# Patient Record
(demographics unavailable — no encounter records)

---

## 2024-12-17 NOTE — PHYSICAL EXAM
[Chaperone Present] : A chaperone was present in the examining room during all aspects of the physical examination [37120] : A chaperone was present during the pelvic exam. [Appropriately responsive] : appropriately responsive [Alert] : alert [No Acute Distress] : no acute distress [No Lymphadenopathy] : no lymphadenopathy [Regular Rate Rhythm] : regular rate rhythm [No Murmurs] : no murmurs [Clear to Auscultation B/L] : clear to auscultation bilaterally [Soft] : soft [Non-tender] : non-tender [Non-distended] : non-distended [No Lesions] : no lesions [No Mass] : no mass [Oriented x3] : oriented x3 [Examination Of The Breasts] : a normal appearance [No Masses] : no breast masses were palpable [Labia Majora] : normal [Labia Minora] : normal [Normal] : normal [Uterine Adnexae] : normal [FreeTextEntry2] : jono

## 2024-12-17 NOTE — PLAN
[FreeTextEntry1] : annual: P 4 PAP and hpv and gc/ct  frustated with post coital bleeding was doing fine on nexplanon but now this is out since Dr Oshea removed tubes in feb *I did note fibroids on exam : saline sonohyst ordered *I did also note cx bleeds readily on contact (pap): may try sliver nitrazine  *note currently on wellbutrn/ metformin for wt loss

## 2024-12-17 NOTE — HISTORY OF PRESENT ILLNESS
[Patient reported PAP Smear was normal] : Patient reported PAP Smear was normal [N] : Patient does not use contraception [Y] : Positive pregnancy history [Menarche Age: ____] : age at menarche was [unfilled] [Men] : men [No] : No [Regular Cycle Intervals] : periods have been regular [Frequency: Q ___ days] : menstrual periods occur approximately every [unfilled] days [PapSmeardate] : 2023 [LMPDate] : 11/22/24 [MensesFreq] : 30 [MensesLength] : 9 [PGxTotal] : 7 [[facility]xLiving] : 4 [TextBox_6] : 11/22/24 [TextBox_9] : 13 [FreeTextEntry1] : 11/22/24

## 2025-01-24 NOTE — ASSESSMENT
[FreeTextEntry1] : Assessment:  HOLLIS OSUNA is a 38 year F with a recently passed left ureteral stone. Also with possible recurrent UTIs, not confirmed with cultures.    We discussed generalized stone prevention strategies, metabolic evaluation (serum chemistry profile and 24-hour urine collection +/- PTH testing if serum Ca is elevated), and the natural history of kidney stone disease. I explained that first-time stone formers generally do not need a complete metabolic work-up in the absence of risk factors. However, without behavioral and dietary modification, they are at a heightened risk of developing future symptomatic stones: 10% at 2 years, 20% at 5 years, and 30% at 10 years ([facility] data). In recurrent stone formers, the risk of recurrence is considerably higher with a lifetime recurrence rate of 60-80%. Risk factors include stone type, total stone volume, family history, multiple stones, and many medical comorbidities (DM, HTN, HLD, obesity, gout, HPT).    Generalized stone prevention counseling was provided as follows:  1. High fluid intake. The goal should be to drink enough to produce 2.5 liters (quarts) of urine daily. Most studies have shown that high fluid volume intake will decrease the risk of stone formation. Research generally indicates that there appears to be little difference between hard and soft water in the formation of kidney stones. Lemon juice added to the water may also aid with increasing urine pH, urine citric acid and reducing stone formation.    2. Dietary recommendations. The key to all dietary recommendations is moderation.   Decrease animal protein consumption. High protein intake increases urinary calcium, oxalate, and uric acid excretion into the urine - all of which will increase the probability of stone formation.   Decrease sodium intake by avoiding heavily salted foods, and resist adding salt to food at the table. Sodium restriction is widely recognized as an important element of dietary prevention of recurrent kidney stones.   Dietary calcium. Patient should consume a daily recommended allowance of dietary calcium (800-1200 mg). Patients who take in too much Ca or too little Ca are at an increased risk of recurrent stone formation. Dietary calcium is preferable to supplements. Calcium supplementation can be safe when the calcium is taken with meals, rather than at bedtime. Another suggestion for patients who have been recommended to take calcium supplements for their bone health is to consider using calcium citrate, an over-the-counter preparation that provides 950 mg of calcium citrate and 200 mg of elemental calcium in each tablet.   Avoid excess intake of foods with high oxalate content, including dark leafy greens, beets, nuts, tea, coffee, and chocolate. Strict avoidance of oxalate is not necessary in most cases.   Avoid high doses of vitamin C. Intake should be limited to a maximum daily dose of less than 2 gm (2,000 mg).       Plan:  -[facility] -Follow up visit in 1 month with renal ultrasound  -Notify the office if UTI symptoms develop for UCx

## 2025-01-24 NOTE — ADDENDUM
[FreeTextEntry1] : I, Dr. Ken Vogel, personally performed the evaluation and management (E/M) services for this new patient. This includes conducting the clinically appropriate initial history &/or physical exam, assessing all conditions, and establishing the care plan. My NP, Reina Parson, was here to observe my E/M services for this patient.I, Dr. Ken oVgel, personally performed the evaluation and management (E/M) services for this new patient. This includes conducting the clinically appropriate initial history &/or physical exam, assessing all conditions, and establishing the care plan. My NP, Reina Parson, was here to observe my E/M services for this patient.

## 2025-01-24 NOTE — HISTORY OF PRESENT ILLNESS
[FreeTextEntry1] : Name HOLLIS OSUNA MRN [id number]  1986 ------------------------------------------------------------------------------------------------------------------------------------------- Date of First Visit:  2025 Referring Provider/PCP: [facility] ED  ------------------------------------------------------------------------------------------------------------------------------------------- CC: kidney stones   History of Present Illness: HOLLIS OSUNA is a 38-year-old female with no significant PMH of who presents for evaluation of kidney stones. She presented to [facility] ED on  with left flank pain. CT demonstrated a 3mm stone in the left UVJ with mild hydronephrosis. WBC 8.63, creatinine 0.78, UA with blood and small leukocytes. Stone passed two days ago. Was not able to save for analysis. She still has some bladder discomfort and sensitivity. She has had several reported UTI's over the past few months and experiences frequency, suprapubic pain and strong-smelling urine (August, October, January). No formal UCX checked but feels better after abx treatment.   Imaging: CT scan from 2025 can be found in [facility] PACS. Findings: A 3 mm stone in the distal left ureter/UVJ resulting in mild left-sided hydronephrosis with trace perinephric and periureteral fat stranding.    Previous urine cultures: 2025: Negative    Kidney Stone History: First-time stone former - No Concurrent asymptomatic stone(s) - Yes - punctate  Previous stone surgeries - No Previous passed stones - Yes - 5 years ago during pregnancy Comorbidities - non-contributory Family history of kidney stones - No Previous metabolic evaluation - No

## 2025-05-21 NOTE — ASSESSMENT
[FreeTextEntry1] : 38F w/fibroids, obesity, here for L elbow pain. L elbow pain - Naproxen 500mg BID, Tylenol 1g PRN, L elbow xray ordered, referral to ortho placed. R/o RA - check RF, CCP, ESR, CRP d/t pts extensive FH of RA, if elevated, will send to rheum

## 2025-05-21 NOTE — PHYSICAL EXAM
[No Acute Distress] : no acute distress [Well-Appearing] : well-appearing [Clear to Auscultation] : lungs were clear to auscultation bilaterally [Normal Rate] : normal rate  [Regular Rhythm] : with a regular rhythm [No Focal Deficits] : no focal deficits [Normal Affect] : the affect was normal [Normal Insight/Judgement] : insight and judgment were intact [de-identified] : mild TTP at medial epicondyle, no edema, erythema, ROM intact

## 2025-05-21 NOTE — HISTORY OF PRESENT ILLNESS
[FreeTextEntry1] : f/u  [de-identified] : 38F w/fibroids, obesity, here for L elbow pain.  L elbow pain - 2-3 x months, feels stiff in the morning, tried naproxen and tyelnol, with minimal relief. Reports FH of RA in mother, dx early 40s?, now with L elbow contracture. However pt unsure of history and progression.

## 2025-06-13 NOTE — END OF VISIT
[FreeTextEntry3] : Documented by Liliya Matthews acting as a scribe for Ana Lilia Garsia on 06/13/2025.   All medical record entries made by the Scribe were at my, Dr. Ana Lilia Garsia, direction and personally dictated by me on 06/13/2025. I have reviewed the chart and agree that the record accurately reflects my personal performance of the history, physical exam, assessment, and plan. I have also personally directed, reviewed, and agreed with the chart.

## 2025-06-13 NOTE — ADDENDUM
[FreeTextEntry1] : I, Liliya Matthews ([facility]) assisted in filling out this chart under the dictation of Ana Lilia Garsia on 06/13/2025.

## 2025-06-13 NOTE — PHYSICAL EXAM
[de-identified] : General: Well-nourished, well-developed, alert, and in no acute distress.  Head: Normocephalic.  Eyes: Pupils equal, extraocular muscles intact, normal sclera.  Nose: No nasal discharge.  Cardiovascular: Extremities are warm and well-perfused. Distal pulses are symmetric bilaterally.  Respiratory: No labored breathing.  Extremities: Sensation is intact distally bilaterally. Distal pulses are symmetric bilaterally  Lymphatic: No regional lymphadenopathy, no lymphedema  Neurologic: No focal deficits  Skin: Normal skin color, texture, and turgor  Psychiatric: Normal affect  [de-identified] : Date: 06/13/2025 Location: [facility] Body part: XRAY B/L Elbow independently reviewed and interpreted by me. Impression: There is no evidence of fracture or dislocation. The joint spaces are preserved.

## 2025-06-13 NOTE — ASSESSMENT
[FreeTextEntry1] : HOLLIS OSUNA is a 38-year-old female with left elbow pain. I discussed with the patient that their symptoms, signs, and imaging are most consistent with medial epicondylitis. We reviewed the natural history of this condition and treatment options. We agreed on the following plan:     XR taken and reviewed with the patient today. Activity Modification: Start Home Exercises for medial epicondylitis conditioning. Demonstration and handout provided. Start physical therapy. Referral provided. Medication: Diclofenac Gel as prescribed by PCP. Referral for epicondylitis brace/strap provided. Advanced Imaging: Consider MRI if no symptomatic improvement. Follow up in 6-8 weeks.

## 2025-06-13 NOTE — HISTORY OF PRESENT ILLNESS
[de-identified] : HOLLIS OSUNA is a 38-year-old female who presents with left elbow pain onset of 3 months. The patient presents with her 18-year-old daughter today. She experiences more pain when bending. Her pain level is at 2/10. The patient reports intermittent left elbow pain that varies in severity, with some days being worse than others. The pain is particularly noticeable in the morning after sleeping with the elbow bent. There is no history of trauma or injury to the elbow. The patient occasionally takes Tylenol for the pain but prefers not to use it frequently. She works as a labor nurse and notes that while the pain is present, it does not prevent her from performing her duties. The pain is localized to the medial aspect of the elbow and she denies previous treatments. She states her fingers get numbness and tingling. The patient does not exercise regularly.